# Patient Record
Sex: FEMALE | Race: WHITE | NOT HISPANIC OR LATINO | URBAN - METROPOLITAN AREA
[De-identification: names, ages, dates, MRNs, and addresses within clinical notes are randomized per-mention and may not be internally consistent; named-entity substitution may affect disease eponyms.]

---

## 2018-01-01 ENCOUNTER — OFFICE VISIT - HEALTHEAST (OUTPATIENT)
Dept: GERIATRICS | Facility: CLINIC | Age: 83
End: 2018-01-01

## 2018-01-01 ENCOUNTER — RECORDS - HEALTHEAST (OUTPATIENT)
Dept: LAB | Facility: CLINIC | Age: 83
End: 2018-01-01

## 2018-01-01 ENCOUNTER — AMBULATORY - HEALTHEAST (OUTPATIENT)
Dept: GERIATRICS | Facility: CLINIC | Age: 83
End: 2018-01-01

## 2018-01-01 ENCOUNTER — HOSPITAL ENCOUNTER (OUTPATIENT)
Dept: RADIOLOGY | Facility: HOSPITAL | Age: 83
Discharge: HOME | End: 2018-06-11

## 2018-01-01 ENCOUNTER — RECORDS - HEALTHEAST (OUTPATIENT)
Dept: ADMINISTRATIVE | Facility: OTHER | Age: 83
End: 2018-01-01

## 2018-01-01 ENCOUNTER — AMBULATORY - HEALTHEAST (OUTPATIENT)
Dept: ADMINISTRATIVE | Facility: CLINIC | Age: 83
End: 2018-01-01

## 2018-01-01 DIAGNOSIS — D64.9 ANEMIA: ICD-10-CM

## 2018-01-01 DIAGNOSIS — E46 MALNUTRITION (H): ICD-10-CM

## 2018-01-01 DIAGNOSIS — J44.9 COPD (CHRONIC OBSTRUCTIVE PULMONARY DISEASE) (H): ICD-10-CM

## 2018-01-01 DIAGNOSIS — J18.9 CAP (COMMUNITY ACQUIRED PNEUMONIA): ICD-10-CM

## 2018-01-01 DIAGNOSIS — R53.1 WEAKNESS: ICD-10-CM

## 2018-01-01 DIAGNOSIS — I10 HYPERTENSION: ICD-10-CM

## 2018-01-01 DIAGNOSIS — T17.908A ASPIRATION INTO AIRWAY: ICD-10-CM

## 2018-01-01 DIAGNOSIS — R13.10 DYSPHAGIA, IDIOPATHIC: ICD-10-CM

## 2018-01-01 LAB
ANION GAP SERPL CALCULATED.3IONS-SCNC: 6 MMOL/L (ref 5–18)
BUN SERPL-MCNC: 12 MG/DL (ref 8–28)
CALCIUM SERPL-MCNC: 9 MG/DL (ref 8.5–10.5)
CHLORIDE BLD-SCNC: 100 MMOL/L (ref 98–107)
CO2 SERPL-SCNC: 39 MMOL/L (ref 22–31)
CREAT SERPL-MCNC: 0.5 MG/DL (ref 0.6–1.1)
ERYTHROCYTE [DISTWIDTH] IN BLOOD BY AUTOMATED COUNT: 16.2 % (ref 11–14.5)
GFR SERPL CREATININE-BSD FRML MDRD: >60 ML/MIN/1.73M2
GLUCOSE BLD-MCNC: 89 MG/DL (ref 70–125)
HCT VFR BLD AUTO: 29.7 % (ref 35–47)
HGB BLD-MCNC: 8.3 G/DL (ref 12–16)
MAGNESIUM SERPL-MCNC: 1.6 MG/DL (ref 1.8–2.6)
MCH RBC QN AUTO: 28.9 PG (ref 27–34)
MCHC RBC AUTO-ENTMCNC: 27.9 G/DL (ref 32–36)
MCV RBC AUTO: 104 FL (ref 80–100)
PLATELET # BLD AUTO: 191 THOU/UL (ref 140–440)
PMV BLD AUTO: 10.7 FL (ref 8.5–12.5)
POTASSIUM BLD-SCNC: 4.6 MMOL/L (ref 3.5–5)
RBC # BLD AUTO: 2.87 MILL/UL (ref 3.8–5.4)
SODIUM SERPL-SCNC: 145 MMOL/L (ref 136–145)
WBC: 5.4 THOU/UL (ref 4–11)

## 2018-01-01 RX ORDER — FERROUS SULFATE 325(65) MG
1 TABLET ORAL
Status: SHIPPED | COMMUNITY
Start: 2018-01-01

## 2018-01-01 RX ORDER — CETIRIZINE HYDROCHLORIDE 5 MG/1
5 TABLET ORAL DAILY
Status: SHIPPED | COMMUNITY
Start: 2018-01-01

## 2018-01-01 RX ORDER — MIRTAZAPINE 7.5 MG/1
7.5 TABLET, FILM COATED ORAL AT BEDTIME
Status: SHIPPED | COMMUNITY
Start: 2018-01-01

## 2018-01-01 RX ORDER — LEVALBUTEROL INHALATION SOLUTION 0.63 MG/3ML
1 SOLUTION RESPIRATORY (INHALATION) 4 TIMES DAILY
Status: SHIPPED | COMMUNITY
Start: 2018-01-01

## 2018-01-01 RX ORDER — LISINOPRIL 5 MG/1
5 TABLET ORAL DAILY
Status: SHIPPED | COMMUNITY
Start: 2018-01-01

## 2018-01-01 RX ORDER — FLUTICASONE PROPIONATE AND SALMETEROL XINAFOATE 115; 21 UG/1; UG/1
2 AEROSOL, METERED RESPIRATORY (INHALATION) 2 TIMES DAILY
Status: SHIPPED | COMMUNITY
Start: 2018-01-01

## 2018-01-01 RX ORDER — BENZONATATE 100 MG/1
100 CAPSULE ORAL 3 TIMES DAILY PRN
Status: SHIPPED | COMMUNITY
Start: 2018-01-01

## 2018-01-01 RX ORDER — ACETAMINOPHEN 500 MG
500-1000 TABLET ORAL EVERY 4 HOURS PRN
Status: SHIPPED | COMMUNITY
Start: 2018-01-01

## 2018-01-01 RX ORDER — GABAPENTIN 100 MG/1
100 CAPSULE ORAL 2 TIMES DAILY
Status: SHIPPED | COMMUNITY
Start: 2018-01-01

## 2018-01-01 RX ORDER — IPRATROPIUM BROMIDE AND ALBUTEROL SULFATE 2.5; .5 MG/3ML; MG/3ML
3 SOLUTION RESPIRATORY (INHALATION) EVERY 4 HOURS PRN
Status: SHIPPED | COMMUNITY
Start: 2018-01-01

## 2019-01-01 ENCOUNTER — OFFICE VISIT - HEALTHEAST (OUTPATIENT)
Dept: HOSPICE | Facility: HOSPICE | Age: 84
End: 2019-01-01

## 2021-06-16 PROBLEM — T17.908A ASPIRATION INTO AIRWAY: Status: ACTIVE | Noted: 2018-01-01

## 2021-06-16 PROBLEM — J18.9 CAP (COMMUNITY ACQUIRED PNEUMONIA): Status: ACTIVE | Noted: 2018-01-01

## 2021-06-16 PROBLEM — Z51.5 HOSPICE CARE: Status: ACTIVE | Noted: 2019-01-01

## 2021-06-16 PROBLEM — J44.9 COPD (CHRONIC OBSTRUCTIVE PULMONARY DISEASE) (H): Status: ACTIVE | Noted: 2018-01-01

## 2021-06-16 PROBLEM — I10 HYPERTENSION: Status: ACTIVE | Noted: 2018-01-01

## 2021-06-16 PROBLEM — R53.1 WEAKNESS: Status: ACTIVE | Noted: 2018-01-01

## 2021-06-18 NOTE — PROGRESS NOTES
[facility]    Facility:   Formerly named [facility] [[id number]]   Code Status: DNR/DNI  PCP: Sadia Jenkins MD   Phone: [phone]   Fax: [phone]      CHIEF COMPLAINT/REASON FOR VISIT:  Chief Complaint   Patient presents with     Discharge Summary     [facility]CU  5/25/18-6/27/18.       HISTORY COURSE:    HPI (TCU H & P 6/1/18):   Kerry is a 84 y.o. female with history of COPD, dependent on oxygen at home recently hospitalized for pneumonia and weakness at [facility]. Her discharge summary is partially excerpted below:    HPI: 84-year-old female comes from a senior living facility with several days of weakness. This has been going on about a week. Kids brought some Mother's Day food over a couple of days ago and she was feeling weak at that time. She did eat. However, she has not had much appetite all week. She has a chronic right-handed tremor and feels like her tremor is worse recently. Has a nonproductive cough with occasional thick yellow sputum over the last several days. Has chronic oxygen-dependent COPD and feels a little bit more dyspneic with exertion. Mostly, she feels weak. This morning, she could barely get out of her chair, so she comes to the emergency department. No falls. She does use a cane or a walker all the time. No medication changes recently. Has been compliant with her medications. Has been wearing her oxygen religiously. No fever. No nausea or vomiting. No diarrhea or constipation. No melena.    Hospital Course:   Acute on chronic hypoxic and hypercarbic respiratory failure  -felt due to LLL community acquired pneumonia, started on broad spectrum abx but she continued to worsen felt due to COPD exacerbation  -developed worsened hypercarbic and hypoxic respiratory failure requiring intubation  -after several days was extubated, back to baseline oxygenation of 3liters via nc  -finished course of abx, and prednisone quickly tapered  -given severity  of disease she remains tenuous and changed to DNAR status. Recommended comfort cares in future if breathing deteriorates  -given ongoing weakness plans made for discharge to TCU for ongoing therapies      Weakness.   - Suspect this is multifactorial due to a combination of pneumonia and anemia.     -Physical Therapy and Occupational Therapy saw and TCU recommended and arranged  -her swallow also quite weak, video swallow showed aspiration on thin liquids, so put on pudding thick liquids with plans for ongoing speech therapy and f/u video swallow in a couple of weeks  -given wt loss and anemia, there is concern of underlying malignancy, with initial w/u including CT chest/abd/pelvis negative(with recommendation for 3 month chest CT f/u)     Severe protein/calorie malnutrition  -30# wt loss over past 2 yrs  -discussed need to continue to push calories and nutritious foods     Anemia.    -No evidence of melena or other blood loss.    -hemolysis testing negative  -very low iron levels, given iv venofer load and started po iron upon discharge  - SPEP still pending at time of discharge     Pulm artery hypertension   -echo done prior to admission with preserved EF, +diastolic dysfunction, moderate pulm HTN and moderate tricuspid regurgitation  -given pulm HTN treated with diuresis in ICU but not felt indicated as outpt     Pressure ulcers  -she does have unstageable pressure ulcers buttocks which were present on admission     Overall stabilized and discharged to TCU on 5/25/18 for PT, OT, nursing cares, medical management and monitoring.     TCU:  TCU course slow due to general weakness and debilitation. Initial cough and more shortness of breath, improved as she recovered from pneumonia. Baseline aspiration concern with pudding thick recommendation. She had repeat VFSS to confirm. She continued on oxygen as per home. No fever. Medical status and conditions were monitored as warranted while she participated in therapy and  received nursing care and assistance. Eventually felt almost at baseline respiratory status. Concern she would not be able to return home independent but  worked with her and her family. She is planning to discharge home tomorrow 6/27/18 with at least 4 hours hired aide services and additional home health care services post TCU protocol.      PHYSICAL EXAM:   General: Patient is alert pleasant female, no distress.   Vitals: Reviewed. On oxygen.  HEENT: Head is NCAT. Eyes show no injection or icterus. Nares negative. Oropharynx well hydrated.  Neck: Supple. No tenderness or adenopathy. No JVD.  Lungs: Decreased breath sounds. No wheezes.  Cardiovascular: Regular rate and rhythm, normal S1, S2.  Back: No spinal or CVA tenderness.  Abdomen: Soft, no tenderness on exam. Bowel sounds present. No guarding rebound or rigidity.  Extremities: One plus LE edema.      MEDICATION LIST:  Current Outpatient Prescriptions   Medication Sig     acetaminophen (TYLENOL) 500 MG tablet Take 500-1,000 mg by mouth every 4 (four) hours as needed for pain.     aspirin 81 MG EC tablet Take 81 mg by mouth daily.     benzonatate (TESSALON) 100 MG capsule Take 100 mg by mouth 3 (three) times a day as needed for cough.     cetirizine (ZYRTEC) 5 MG tablet Take 5 mg by mouth daily.     ferrous sulfate 325 (65 FE) MG tablet Take 1 tablet by mouth daily with breakfast.     fluticasone-salmeterol (ADVAIR HFA) 115-21 mcg/actuation inhaler Inhale 2 puffs 2 (two) times a day.     folic acid/multivit-min/lutein (CENTRUM SILVER ORAL) Take 1 tablet by mouth daily.     gabapentin (NEURONTIN) 100 MG capsule Take 100 mg by mouth 2 (two) times a day.     ipratropium-albuterol (DUO-NEB) 0.5-2.5 mg/3 mL nebulizer Inhale 3 mL every 4 (four) hours as needed.     levalbuterol (XOPENEX) 0.63 mg/3 mL nebulizer solution Take 1 ampule by nebulization 4 (four) times a day.     lisinopril (PRINIVIL,ZESTRIL) 5 MG tablet Take 5 mg by mouth daily.      mirtazapine (REMERON) 7.5 MG tablet Take 7.5 mg by mouth at bedtime.     triamcinolone (NASACORT) 55 mcg nasal inhaler 2 sprays into each nostril daily.     umeclidinium (INCRUSE ELLIPTA) 62.5 mcg/actuation DsDv inhaler Inhale 1 puff daily.       DISCHARGE DIAGNOSIS:  1. COPD. Oxygen dependent. Acute on chronic respiratory failure due to pneumonia.   2. CAP. Treated.  3. Anemia. She is on iron.  4. HTN.   5. Malnutrition.   6. Weakness. Overall debilitation.   7. Aspiration risk. She is on pudding thick.       Total time greater than 30 minutes discharge coordination.      MEDICAL EQUIPMENT NEEDS:  None.      DISCHARGE PLAN/FACE TO FACE:  I certify that services are/were furnished while this patient was under the care of a physician and that a physician or an allowed non-physician practitioner (NPP), had a face-to-face encounter that meets the physician face-to-face encounter requirements. The encounter was in whole, or in part, related to the primary reason for home health. The patient is confined to his/her home and needs intermittent skilled nursing, physical therapy, speech-language pathology, or the continued need for occupational therapy. A plan of care has been established by a physician and is periodically reviewed by a physician.    Date of Face-to-Face Encounter: 6/26/18.    I certify that, based on my findings, the following services are medically necessary home health services: PT, OT, ST, HHA, RN.    My clinical findings support the need for the above skilled services because: Chronic respiratory failure, recent hospitalization for pneumonia, ongoing concern for aspiration with need for altered diet and liquids. She will benefit from additional therapy as she returns home. Needs aide to assist with cares, ongoing speech therapy and nurse for clinical assessments.     This patient is homebound because: General debilitation, chronic oxygen use. Too strenuous to leave the home.     The patient is, or has  been, under my care and I have initiated the establishment of the plan of care. This patient will be followed by a physician who will periodically review the plan of care.    Schedule follow up visit with primary care provider within 7 days to reestablish care.    Electronically signed by: Mayuri Hubbard MD

## 2021-06-18 NOTE — PROGRESS NOTES
[facility]    Facility:   [facility] [[id number]]   Code Status: DNR/DNI       Chief Complaint   Patient presents with     Follow Up     TCU 6/21/18.       HPI (TCU H & P 6/1/18):   Kerry is a 84 y.o. female with history of COPD, dependent on oxygen at home recently hospitalized for pneumonia and weakness at [facility]. Her discharge summary is partially excerpted below:    HPI: 84-year-old female comes from a senior living facility with several days of weakness. This has been going on about a week. Kids brought some Mother's Day food over a couple of days ago and she was feeling weak at that time. She did eat. However, she has not had much appetite all week. She has a chronic right-handed tremor and feels like her tremor is worse recently. Has a nonproductive cough with occasional thick yellow sputum over the last several days. Has chronic oxygen-dependent COPD and feels a little bit more dyspneic with exertion. Mostly, she feels weak. This morning, she could barely get out of her chair, so she comes to the emergency department. No falls. She does use a cane or a walker all the time. No medication changes recently. Has been compliant with her medications. Has been wearing her oxygen religiously. No fever. No nausea or vomiting. No diarrhea or constipation. No melena.    Hospital Course:   Acute on chronic hypoxic and hypercarbic respiratory failure  -felt due to LLL community acquired pneumonia, started on broad spectrum abx but she continued to worsen felt due to COPD exacerbation  -developed worsened hypercarbic and hypoxic respiratory failure requiring intubation  -after several days was extubated, back to baseline oxygenation of 3liters via nc  -finished course of abx, and prednisone quickly tapered  -given severity of disease she remains tenuous and changed to DNAR status. Recommended comfort cares in future if breathing deteriorates  -given ongoing  weakness plans made for discharge to TCU for ongoing therapies      Weakness.   - Suspect this is multifactorial due to a combination of pneumonia and anemia.     -Physical Therapy and Occupational Therapy saw and TCU recommended and arranged  -her swallow also quite weak, video swallow showed aspiration on thin liquids, so put on pudding thick liquids with plans for ongoing speech therapy and f/u video swallow in a couple of weeks  -given wt loss and anemia, there is concern of underlying malignancy, with initial w/u including CT chest/abd/pelvis negative(with recommendation for 3 month chest CT f/u)     Severe protein/calorie malnutrition  -30# wt loss over past 2 yrs  -discussed need to continue to push calories and nutritious foods     Anemia.    -No evidence of melena or other blood loss.    -hemolysis testing negative  -very low iron levels, given iv venofer load and started po iron upon discharge  - SPEP still pending at time of discharge     Pulm artery hypertension   -echo done prior to admission with preserved EF, +diastolic dysfunction, moderate pulm HTN and moderate tricuspid regurgitation  -given pulm HTN treated with diuresis in ICU but not felt indicated as outpt     Pressure ulcers  -she does have unstageable pressure ulcers buttocks which were present on admission     Overall stabilized and discharged to TCU on 5/25/18 for PT, OT, nursing cares, medical management and monitoring.       Past Medical History:  Past Medical History:   Diagnosis Date     Anemia      Calculus of kidney      CAP (community acquired pneumonia)      Chronic rhinitis      Closed fracture of multiple ribs      COPD (chronic obstructive pulmonary disease) (H)      Depression      Herpes zoster      HTN (hypertension)      IBS (irritable bowel syndrome)      Kyphosis      Osteoarthritis      Osteoporosis      Pathologic fracture of vertebra      Post herpetic neuralgia      Pulmonary nodule      Septicemia (H)         Medications:  Current Outpatient Prescriptions   Medication Sig     acetaminophen (TYLENOL) 500 MG tablet Take 500-1,000 mg by mouth every 4 (four) hours as needed for pain.     aspirin 81 MG EC tablet Take 81 mg by mouth daily.     benzonatate (TESSALON) 100 MG capsule Take 100 mg by mouth 3 (three) times a day as needed for cough.     cetirizine (ZYRTEC) 5 MG tablet Take 5 mg by mouth daily.     ferrous sulfate 325 (65 FE) MG tablet Take 1 tablet by mouth daily with breakfast.     fluticasone-salmeterol (ADVAIR HFA) 115-21 mcg/actuation inhaler Inhale 2 puffs 2 (two) times a day.     folic acid/multivit-min/lutein (CENTRUM SILVER ORAL) Take 1 tablet by mouth daily.     gabapentin (NEURONTIN) 100 MG capsule Take 100 mg by mouth 2 (two) times a day.     ipratropium-albuterol (DUO-NEB) 0.5-2.5 mg/3 mL nebulizer Inhale 3 mL every 4 (four) hours as needed.     levalbuterol (XOPENEX) 0.63 mg/3 mL nebulizer solution Take 1 ampule by nebulization 4 (four) times a day.     lisinopril (PRINIVIL,ZESTRIL) 5 MG tablet Take 5 mg by mouth daily.     mirtazapine (REMERON) 7.5 MG tablet Take 7.5 mg by mouth at bedtime.     OLANZapine (ZYPREXA) 2.5 MG tablet Take 2.5 mg by mouth at bedtime.     triamcinolone (NASACORT) 55 mcg nasal inhaler 2 sprays into each nostril daily.     umeclidinium (INCRUSE ELLIPTA) 62.5 mcg/actuation DsDv inhaler Inhale 1 puff daily.       Today:  No new concerns. Continues to be weak but overall improved from admission. She is going to discharge back home next week with added help at home. She has shortness of breath but not more than baseline per her report. No chest pain. Denies dizziness. Has LE edema, no change. Appetite fair, she is on pudding thick. Has speech therapy ongoing.      Physical Exam:   General: Patient is alert pleasant female, no distress.   Vitals: Reviewed. On oxygen.  HEENT: Head is NCAT. Eyes show no injection or icterus. Nares negative. Oropharynx well hydrated.  Neck:  Supple. No tenderness or adenopathy. No JVD.  Lungs: Decreased breath sounds. No wheezes.  Cardiovascular: Regular rate and rhythm, normal S1, S2.  Back: No spinal or CVA tenderness.  Abdomen: Soft, no tenderness on exam. Bowel sounds present. No guarding rebound or rigidity.  Extremities: One plus LE edema.      Labs:    Results for orders placed or performed in visit on 06/15/18   Basic Metabolic Panel   Result Value Ref Range    Sodium 145 136 - 145 mmol/L    Potassium 4.6 3.5 - 5.0 mmol/L    Chloride 100 98 - 107 mmol/L    CO2 39 (H) 22 - 31 mmol/L    Anion Gap, Calculation 6 5 - 18 mmol/L    Glucose 89 70 - 125 mg/dL    Calcium 9.0 8.5 - 10.5 mg/dL    BUN 12 8 - 28 mg/dL    Creatinine 0.50 (L) 0.60 - 1.10 mg/dL    GFR MDRD Af Amer >60 >60 mL/min/1.73m2    GFR MDRD Non Af Amer >60 >60 mL/min/1.73m2       Lab Results   Component Value Date    WBC 5.4 06/15/2018    HGB 8.3 (L) 06/15/2018    HCT 29.7 (L) 06/15/2018     (H) 06/15/2018     06/15/2018       Assessment/Plan:  1. Oxygen dependent COPD with chronic resp failure.   2. HTN. Overall satisfactory control. Continue Lisinopril.  3. Anemia. Last hgb 8.3. Continue iron.  4. Aspiration. Indefinite pudding thick recommendation.      Electronically signed by: Mayuri Hubbard MD

## 2021-06-18 NOTE — PROGRESS NOTES
[facility]      Facility:    [facility] [[id number]]  Code Status: DNR/DNI       Chief Complaint/Reason for Visit:  Chief Complaint   Patient presents with     H & P     New admit to TCU for CAP, COPD. (H & P 6/1/18).       HPI:   Kerry is a 84 y.o. female with history of COPD, dependent on oxygen at home recently hospitalized for pneumonia and weakness at [facility]. Her discharge summary is partially excerpted below:    HPI: 84-year-old female comes from a senior living facility with several days of weakness. This has been going on about a week. Kids brought some Mother's Day food over a couple of days ago and she was feeling weak at that time. She did eat. However, she has not had much appetite all week. She has a chronic right-handed tremor and feels like her tremor is worse recently. Has a nonproductive cough with occasional thick yellow sputum over the last several days. Has chronic oxygen-dependent COPD and feels a little bit more dyspneic with exertion. Mostly, she feels weak. This morning, she could barely get out of her chair, so she comes to the emergency department. No falls. She does use a cane or a walker all the time. No medication changes recently. Has been compliant with her medications. Has been wearing her oxygen religiously. No fever. No nausea or vomiting. No diarrhea or constipation. No melena.    Hospital Course: admitted through ER to medical unit and the following issues were addressed:    Acute on chronic hypoxic and hypercarbic respiratory failure  -felt due to LLL community acquired pneumonia, started on broad spectrum abx but she continued to worsen felt due to COPD exacerbation  -developed worsened hypercarbic and hypoxic respiratory failure requiring intubation  -after several days was extubated, back to baseline oxygenation of 3liters via nc  -finished course of abx, and prednisone quickly tapered  -given severity of disease she  remains tenuous and changed to DNAR status. Recommended comfort cares in future if breathing deteriorates  -given ongoing weakness plans made for discharge to TCU for ongoing therapies      Weakness.   - Suspect this is multifactorial due to a combination of pneumonia and anemia.     -Physical Therapy and Occupational Therapy saw and TCU recommended and arranged  -her swallow also quite weak, video swallow showed aspiration on thin liquids, so put on pudding thick liquids with plans for ongoing speech therapy and f/u video swallow in a couple of weeks  -given wt loss and anemia, there is concern of underlying malignancy, with initial w/u including CT chest/abd/pelvis negative(with recommendation for 3 month chest CT f/u)     Severe protein/calorie malnutrition  -30# wt loss over past 2 yrs  -discussed need to continue to push calories and nutritious foods     Anemia.    -No evidence of melena or other blood loss.    -hemolysis testing negative  -very low iron levels, given iv venofer load and started po iron upon discharge  - SPEP still pending at time of discharge     pulm artery hypertension   -echo done prior to admission with preserved EF, +diastolic dysfunction, moderate pulm HTN and moderate tricuspid regurgitation  -given pulm HTN treated with diuresis in ICU but not felt indicated as outpt     Pressure ulcers  -she does have unstageable pressure ulcers buttocks which were present on admission     Overall stabilized and discharged to TCU on 5/25/18 for PT, OT, nursing cares, medical management and monitoring.     Today:  She feels weak but a little better than the hospital. Some cough, little phlegm. No fever. On oxygen as at home. Shortness of breath with exertion. No chest pain. Appetite is fair. No diarrhea or constipation. No abdominal pain. No new vision or hearing problems.       Past Medical History:  Past Medical History:   Diagnosis Date     Anemia      Calculus of kidney      CAP (community acquired  pneumonia)      Chronic rhinitis      Closed fracture of multiple ribs      COPD (chronic obstructive pulmonary disease)      Depression      Herpes zoster      HTN (hypertension)      IBS (irritable bowel syndrome)      Kyphosis      Osteoarthritis      Osteoporosis      Pathologic fracture of vertebra      Post herpetic neuralgia      Pulmonary nodule      Septicemia            Surgical History:  Past Surgical History:   Procedure Laterality Date     APPENDECTOMY       CARPAL TUNNEL RELEASE       CATARACT EXTRACTION       TOTAL HIP ARTHROPLASTY Left        Family History:   Family History   Problem Relation Age of Onset     Macular degeneration Mother      Macular degeneration Sister        Social History:    Social History     Social History     Marital status:      Spouse name: N/A     Number of children: N/A     Years of education: N/A     Social History Main Topics     Smoking status: Former Smoker     Packs/day: 0.50     Years: 30.00     Types: Cigarettes     Quit date: 11/2/1992     Smokeless tobacco: Never Used     Alcohol use No     Drug use: Not on file     Sexual activity: Not on file     Other Topics Concern     Not on file     Social History Narrative     No narrative on file        Medications:  Current Outpatient Prescriptions   Medication Sig     acetaminophen (TYLENOL) 500 MG tablet Take 500-1,000 mg by mouth every 4 (four) hours as needed for pain.     aspirin 81 MG EC tablet Take 81 mg by mouth daily.     benzonatate (TESSALON) 100 MG capsule Take 100 mg by mouth 3 (three) times a day as needed for cough.     cetirizine (ZYRTEC) 5 MG tablet Take 5 mg by mouth daily.     ferrous sulfate 325 (65 FE) MG tablet Take 1 tablet by mouth daily with breakfast.     fluticasone-salmeterol (ADVAIR HFA) 115-21 mcg/actuation inhaler Inhale 2 puffs 2 (two) times a day.     folic acid/multivit-min/lutein (CENTRUM SILVER ORAL) Take 1 tablet by mouth daily.     gabapentin (NEURONTIN) 100 MG capsule Take 100  mg by mouth 2 (two) times a day.     ipratropium-albuterol (DUO-NEB) 0.5-2.5 mg/3 mL nebulizer Inhale 3 mL every 4 (four) hours as needed.     levalbuterol (XOPENEX) 0.63 mg/3 mL nebulizer solution Take 1 ampule by nebulization 4 (four) times a day.     lisinopril (PRINIVIL,ZESTRIL) 5 MG tablet Take 5 mg by mouth daily.     mirtazapine (REMERON) 7.5 MG tablet Take 7.5 mg by mouth at bedtime.     OLANZapine (ZYPREXA) 2.5 MG tablet Take 2.5 mg by mouth at bedtime.     triamcinolone (NASACORT) 55 mcg nasal inhaler 2 sprays into each nostril daily.     umeclidinium (INCRUSE ELLIPTA) 62.5 mcg/actuation DsDv inhaler Inhale 1 puff daily.       Allergies:  Allergies   Allergen Reactions     Celecoxib      Codeine      Influenza Virus Vaccines      Penicillins      Sulfa (Sulfonamide Antibiotics)      Tetanus Toxoid, Adsorbed        Review of Systems:  Pertinent items are noted in HPI.      Physical Exam:   General: Patient is alert pleasant female, no distress.   Vitals: Reviewed. O2 per NC.  HEENT: Head is NCAT. Eyes show no injection or icterus. Nares negative. Oropharynx well hydrated.  Neck: Supple. No tenderness or adenopathy. No JVD.  Lungs: Decreased breath sounds. Rhonchi with cough. No wheezes.  Cardiovascular: Regular rate and rhythm, normal S1, S2.  Back: No spinal or CVA tenderness.  Abdomen: Soft, no tenderness on exam. Bowel sounds present. No guarding rebound or rigidity.  : Deferred.  Extremities: Mild edema is noted.  Musculoskeletal: Thin.  Skin: Dry skin.  Psych: Mood appears pretty good.      Labs:  Outside hospital.      Assessment/Plan:  1. COPD. Home oxygen dependent. Acute on chronic respiratory failure due to pneumonia.   2. CAP. Done with antibiotics. Observe. No fever, some cough, overlap with COPD.  3. Anemia. She is on iron.  4. HTN. Cont lisinopril. Monitor BPs.  5. Malnutrition. Dietary following.  6. Weakness. Overall debilitation. Here for therapy.  7. Aspiration risk. She is on pudding  thick. Working with ST.  8. Code status is DNR/DNI.      Total time greater than 55 minutes, greater than 50% counseling and coordination of care, time spent in interview and examination of patient, review of records, discussion with nursing staff.      Electronically signed by: Mayuri Hubbard MD

## 2021-06-18 NOTE — PROGRESS NOTES
Speech Language/Pathology  Videofluoroscopic Swallow Study       Problem:  There is no problem list on file for this patient.      Onset Date: 6/6/2018 (order date)  Reason for Evaluation: Recent hx of dysphagia  Pertinent History: COPD with O2 dependence; acute on chronic respiratory failure due to pneumonia; anemia; malnutrition; weight loss (30# over the past 2 years); weakness and overall debilitation  Current Diet: NDD3 textures and pudding-thick (AKA: spoon-thick) liquids  Baseline Diet: Regular textures and thin liquids (prior to recent hospitalization)    Patient is an 85 y/o female referred for recent history of dysphagia. She is currently residing in the TCU at [facility]. Patient was recently hospitalized for pneumonia and overall weakness at [facility]. A previous Video Swallow Study on 5/23/18 showed aspiration of thin and nectar-thick liquid, as well as deep laryngeal penetration and eventual trace aspiration with honey-thick liquid. Patient is currently on pudding-thick liquids at the TCU and is receiving Speech Therapy for dysphagia. The purpose of this study to assess for any improvement in patient's swallow function, determine her aspiration risk, and establish additional safe swallowing strategies as appropriate.    Patient presents as alert and cooperative during this evaluation. She was accompanied by her daughter, Dunia, who observed study and was present afterwards to receive education regarding results and recommendations.    An  was not applicable    Patient was given honey-thick liquid and puree consistency only. Thin and nectar-thick liquid were not given due to high aspiration risk.    Oral Phase:    Dentition/Oral hygiene: Adequate    Bolus prep and oral control were mildly impaired. Piecemeal deglutition was noted with puree. Patient often completed 3-4 swallows per single bolus.    Anterior-Posterior transit was not impaired.    Premature spillage did not  occur with either consistency.    Tongue base retraction was mildly impaired.    Mild oral stasis was noted along the tongue base with both honey-thick liquid and puree.    Pharyngeal Phase:    Edgar aspiration occurred with honey-thick liquid. This occurred after the swallow from stasis in the pyriform sinuses. Patient had no cough response (i.e., aspiration was silent).    Use of the chin tuck posture resulted in deep laryngeal penetration to the vocal folds with honey-thick liquid. Again, this was related to stasis in the pyriform sinuses. Deep laryngeal penetration was also noted with initial bolus of puree when patient was using the chin tuck posture. This was thought to be residual penetration from prior presentations of honey-thick liquid.    Swallow response was timely with both consistencies.     Epiglottic movement was generally complete to near complete with both consistencies. There were one or two occasions in which epiglottic movement was in a posterior to inferior pattern.    Diffuse pharyngeal stasis occurred with both honey-thick liquid and puree consistency. With honey-thick, there was moderate stasis in the valleculae and pyriform sinuses, and mild stasis along the posterior pharyngeal wall. The majority of this cleared with spontaneous dry swallows. There was also moderate stasis in the valleculae and pyriform sinuses following boluses of puree. Initially, patient was able to clear most of this with spontaneous dry swallows. However, towards end of study, she was noted to still have moderate pharyngeal stasis despite multiple dry swallows. This appeared indicative of fatigue of the swallowing mechanism.    Pharyngeal constriction was moderately impaired.    Hyolaryngeal elevation was not impaired. Hyolaryngeal excursion was not impaired.    Cricopharyngeal function was mildly to moderately impaired. Patient is noted to have a mildly prominent cricopharyngeus. A small amount of stasis was noted  just below this following boluses of both honey-thick liquid and puree consistency. Incomplete relaxation of the cricopharyngeus may be contributing to decreased clearance of the pyriform sinuses after swallows, thereby increasing aspiration risk. Cervical esophageal function was difficult to assess due to suboptimal positioning (i.e., patient's shoulders were obstructing view).     Assessment:    Patient demonstrated mild oral dysphagia and moderate-severe pharyngeal dysphagia.    Patient is at high aspiration risk with thin, nectar-thick, and honey-thick liquid. She is at mild-moderate aspiration risk with puree consistency.    Rehab potential is guarded based on prior level of function, evaluation results and medical status.    Recommendations:    Plan: Continue current diet of NDD3 textures and pudding-thick liquids at this time.    Strategies: Patient to follow standard safe swallowing precautions, including sitting fully upright for all intake, eating slowly, and taking one small bite or sip at a time. Patient to also complete a chin tuck prior to swallowing each bite/sip.    Continue Speech Therapy at TCU. Frequency and duration to be determined by primary SLP.    Referrals: Repeat Video Swallow Study in 3-4 weeks or per primary SLP's discretion. Consider GI consult due to suspected cricopharyngeal dysfunction which may be contributing to patient's pharyngeal dysphagia.    30 dysphagia minutes    Aruna Stephenson MA, CCC-SLP

## 2021-06-18 NOTE — PROGRESS NOTES
[facility]    Facility:   [facility] [[id number]]   Code Status: DNR/DNI       Chief Complaint   Patient presents with     Follow Up     TCU 6/8/18.       HPI (TCU H & P 6/1/18):   Kerry is a 84 y.o. female with history of COPD, dependent on oxygen at home recently hospitalized for pneumonia and weakness at [facility]. Her discharge summary is partially excerpted below:    HPI: 84-year-old female comes from a senior living facility with several days of weakness. This has been going on about a week. Kids brought some Mother's Day food over a couple of days ago and she was feeling weak at that time. She did eat. However, she has not had much appetite all week. She has a chronic right-handed tremor and feels like her tremor is worse recently. Has a nonproductive cough with occasional thick yellow sputum over the last several days. Has chronic oxygen-dependent COPD and feels a little bit more dyspneic with exertion. Mostly, she feels weak. This morning, she could barely get out of her chair, so she comes to the emergency department. No falls. She does use a cane or a walker all the time. No medication changes recently. Has been compliant with her medications. Has been wearing her oxygen religiously. No fever. No nausea or vomiting. No diarrhea or constipation. No melena.    Hospital Course: admitted through ER to medical unit and the following issues were addressed:    Acute on chronic hypoxic and hypercarbic respiratory failure  -felt due to LLL community acquired pneumonia, started on broad spectrum abx but she continued to worsen felt due to COPD exacerbation  -developed worsened hypercarbic and hypoxic respiratory failure requiring intubation  -after several days was extubated, back to baseline oxygenation of 3liters via nc  -finished course of abx, and prednisone quickly tapered  -given severity of disease she remains tenuous and changed to DNAR status.  Recommended comfort cares in future if breathing deteriorates  -given ongoing weakness plans made for discharge to TCU for ongoing therapies      Weakness.   - Suspect this is multifactorial due to a combination of pneumonia and anemia.     -Physical Therapy and Occupational Therapy saw and TCU recommended and arranged  -her swallow also quite weak, video swallow showed aspiration on thin liquids, so put on pudding thick liquids with plans for ongoing speech therapy and f/u video swallow in a couple of weeks  -given wt loss and anemia, there is concern of underlying malignancy, with initial w/u including CT chest/abd/pelvis negative(with recommendation for 3 month chest CT f/u)     Severe protein/calorie malnutrition  -30# wt loss over past 2 yrs  -discussed need to continue to push calories and nutritious foods     Anemia.    -No evidence of melena or other blood loss.    -hemolysis testing negative  -very low iron levels, given iv venofer load and started po iron upon discharge  - SPEP still pending at time of discharge     pulm artery hypertension   -echo done prior to admission with preserved EF, +diastolic dysfunction, moderate pulm HTN and moderate tricuspid regurgitation  -given pulm HTN treated with diuresis in ICU but not felt indicated as outpt     Pressure ulcers  -she does have unstageable pressure ulcers buttocks which were present on admission     Overall stabilized and discharged to TCU on 5/25/18 for PT, OT, nursing cares, medical management and monitoring.       Past Medical History:  Past Medical History:   Diagnosis Date     Anemia      Calculus of kidney      CAP (community acquired pneumonia)      Chronic rhinitis      Closed fracture of multiple ribs      COPD (chronic obstructive pulmonary disease) (H)      Depression      Herpes zoster      HTN (hypertension)      IBS (irritable bowel syndrome)      Kyphosis      Osteoarthritis      Osteoporosis      Pathologic fracture of vertebra      Post  herpetic neuralgia      Pulmonary nodule      Septicemia (H)        Medications:  Current Outpatient Prescriptions   Medication Sig     acetaminophen (TYLENOL) 500 MG tablet Take 500-1,000 mg by mouth every 4 (four) hours as needed for pain.     aspirin 81 MG EC tablet Take 81 mg by mouth daily.     benzonatate (TESSALON) 100 MG capsule Take 100 mg by mouth 3 (three) times a day as needed for cough.     cetirizine (ZYRTEC) 5 MG tablet Take 5 mg by mouth daily.     ferrous sulfate 325 (65 FE) MG tablet Take 1 tablet by mouth daily with breakfast.     fluticasone-salmeterol (ADVAIR HFA) 115-21 mcg/actuation inhaler Inhale 2 puffs 2 (two) times a day.     folic acid/multivit-min/lutein (CENTRUM SILVER ORAL) Take 1 tablet by mouth daily.     gabapentin (NEURONTIN) 100 MG capsule Take 100 mg by mouth 2 (two) times a day.     ipratropium-albuterol (DUO-NEB) 0.5-2.5 mg/3 mL nebulizer Inhale 3 mL every 4 (four) hours as needed.     levalbuterol (XOPENEX) 0.63 mg/3 mL nebulizer solution Take 1 ampule by nebulization 4 (four) times a day.     lisinopril (PRINIVIL,ZESTRIL) 5 MG tablet Take 5 mg by mouth daily.     mirtazapine (REMERON) 7.5 MG tablet Take 7.5 mg by mouth at bedtime.     OLANZapine (ZYPREXA) 2.5 MG tablet Take 2.5 mg by mouth at bedtime.     triamcinolone (NASACORT) 55 mcg nasal inhaler 2 sprays into each nostril daily.     umeclidinium (INCRUSE ELLIPTA) 62.5 mcg/actuation DsDv inhaler Inhale 1 puff daily.       Today:  Had care conference, might be looking for prison. Continues on pudding thick foods/fluids. Has swallow study next week. Feels overall weak.       Physical Exam:   General: Patient is alert pleasant female, no distress.   Vitals: Reviewed. On oxygen.  HEENT: Head is NCAT. Eyes show no injection or icterus. Nares negative. Oropharynx well hydrated.  Neck: Supple. No tenderness or adenopathy. No JVD.  Lungs: Decreased breath sounds. Rhonchi with cough. No wheezes.  Cardiovascular: Regular rate and  rhythm, normal S1, S2.  Back: No spinal or CVA tenderness.  Abdomen: Soft, no tenderness on exam. Bowel sounds present. No guarding rebound or rigidity.  Extremities: One plus LE edema is noted.  Skin: Dry skin.  Psych: Mood appears pretty good.      Labs:  Outside hospital.      Assessment/Plan:  1. COPD. Home oxygen dependent. Acute on chronic respiratory failure due to pneumonia.   2. CAP. Done with antibiotics. Observe. No fever, some cough, overlap with COPD.  3. HTN. On lisinopril. BPs satisfactory.   4. Weakness. Overall debilitation. Malnutrition. Cont in therapy as able  5. Aspiration risk. She is on pudding thick. Working with ST. Has Swallow study next week.           Electronically signed by: Mayuri Hubbard MD

## 2021-06-18 NOTE — PROGRESS NOTES
[facility]    Facility:   [facility] [[id number]]   Code Status: DNR/DNI       Chief Complaint   Patient presents with     Follow Up     TCU 6/19/18.       HPI (TCU H & P 6/1/18):   Kerry is a 84 y.o. female with history of COPD, dependent on oxygen at home recently hospitalized for pneumonia and weakness at [facility]. Her discharge summary is partially excerpted below:    HPI: 84-year-old female comes from a senior living facility with several days of weakness. This has been going on about a week. Kids brought some Mother's Day food over a couple of days ago and she was feeling weak at that time. She did eat. However, she has not had much appetite all week. She has a chronic right-handed tremor and feels like her tremor is worse recently. Has a nonproductive cough with occasional thick yellow sputum over the last several days. Has chronic oxygen-dependent COPD and feels a little bit more dyspneic with exertion. Mostly, she feels weak. This morning, she could barely get out of her chair, so she comes to the emergency department. No falls. She does use a cane or a walker all the time. No medication changes recently. Has been compliant with her medications. Has been wearing her oxygen religiously. No fever. No nausea or vomiting. No diarrhea or constipation. No melena.    Hospital Course:   Acute on chronic hypoxic and hypercarbic respiratory failure  -felt due to LLL community acquired pneumonia, started on broad spectrum abx but she continued to worsen felt due to COPD exacerbation  -developed worsened hypercarbic and hypoxic respiratory failure requiring intubation  -after several days was extubated, back to baseline oxygenation of 3liters via nc  -finished course of abx, and prednisone quickly tapered  -given severity of disease she remains tenuous and changed to DNAR status. Recommended comfort cares in future if breathing deteriorates  -given ongoing  weakness plans made for discharge to TCU for ongoing therapies      Weakness.   - Suspect this is multifactorial due to a combination of pneumonia and anemia.     -Physical Therapy and Occupational Therapy saw and TCU recommended and arranged  -her swallow also quite weak, video swallow showed aspiration on thin liquids, so put on pudding thick liquids with plans for ongoing speech therapy and f/u video swallow in a couple of weeks  -given wt loss and anemia, there is concern of underlying malignancy, with initial w/u including CT chest/abd/pelvis negative(with recommendation for 3 month chest CT f/u)     Severe protein/calorie malnutrition  -30# wt loss over past 2 yrs  -discussed need to continue to push calories and nutritious foods     Anemia.    -No evidence of melena or other blood loss.    -hemolysis testing negative  -very low iron levels, given iv venofer load and started po iron upon discharge  - SPEP still pending at time of discharge     Pulm artery hypertension   -echo done prior to admission with preserved EF, +diastolic dysfunction, moderate pulm HTN and moderate tricuspid regurgitation  -given pulm HTN treated with diuresis in ICU but not felt indicated as outpt     Pressure ulcers  -she does have unstageable pressure ulcers buttocks which were present on admission     Overall stabilized and discharged to TCU on 5/25/18 for PT, OT, nursing cares, medical management and monitoring.       Past Medical History:  Past Medical History:   Diagnosis Date     Anemia      Calculus of kidney      CAP (community acquired pneumonia)      Chronic rhinitis      Closed fracture of multiple ribs      COPD (chronic obstructive pulmonary disease) (H)      Depression      Herpes zoster      HTN (hypertension)      IBS (irritable bowel syndrome)      Kyphosis      Osteoarthritis      Osteoporosis      Pathologic fracture of vertebra      Post herpetic neuralgia      Pulmonary nodule      Septicemia (H)         Medications:  Current Outpatient Prescriptions   Medication Sig     acetaminophen (TYLENOL) 500 MG tablet Take 500-1,000 mg by mouth every 4 (four) hours as needed for pain.     aspirin 81 MG EC tablet Take 81 mg by mouth daily.     benzonatate (TESSALON) 100 MG capsule Take 100 mg by mouth 3 (three) times a day as needed for cough.     cetirizine (ZYRTEC) 5 MG tablet Take 5 mg by mouth daily.     ferrous sulfate 325 (65 FE) MG tablet Take 1 tablet by mouth daily with breakfast.     fluticasone-salmeterol (ADVAIR HFA) 115-21 mcg/actuation inhaler Inhale 2 puffs 2 (two) times a day.     folic acid/multivit-min/lutein (CENTRUM SILVER ORAL) Take 1 tablet by mouth daily.     gabapentin (NEURONTIN) 100 MG capsule Take 100 mg by mouth 2 (two) times a day.     ipratropium-albuterol (DUO-NEB) 0.5-2.5 mg/3 mL nebulizer Inhale 3 mL every 4 (four) hours as needed.     levalbuterol (XOPENEX) 0.63 mg/3 mL nebulizer solution Take 1 ampule by nebulization 4 (four) times a day.     lisinopril (PRINIVIL,ZESTRIL) 5 MG tablet Take 5 mg by mouth daily.     mirtazapine (REMERON) 7.5 MG tablet Take 7.5 mg by mouth at bedtime.     OLANZapine (ZYPREXA) 2.5 MG tablet Take 2.5 mg by mouth at bedtime.     triamcinolone (NASACORT) 55 mcg nasal inhaler 2 sprays into each nostril daily.     umeclidinium (INCRUSE ELLIPTA) 62.5 mcg/actuation DsDv inhaler Inhale 1 puff daily.       Today:  Labs last week, anemia (chronic). Continues on oxygen as at home. States breathing about baseline. Some cough, no phlegm. No fever. No dizziness. On pudding thick fluids, working with speech therapy. No diarrhea or constipation. Likely discharge in about a week. BPs satisfactory.      Physical Exam:   General: Patient is alert pleasant female, no distress.   Vitals: Reviewed. On oxygen.  HEENT: Head is NCAT. Eyes show no injection or icterus. Nares negative. Oropharynx well hydrated.  Neck: Supple. No tenderness or adenopathy. No JVD.  Lungs: Decreased  breath sounds. No wheezes.  Cardiovascular: Regular rate and rhythm, normal S1, S2.  Back: No spinal or CVA tenderness.  Abdomen: Soft, no tenderness on exam. Bowel sounds present. No guarding rebound or rigidity.  Extremities: One plus LE edema.      Labs:  Results for orders placed or performed in visit on 06/15/18   Basic Metabolic Panel   Result Value Ref Range    Sodium 145 136 - 145 mmol/L    Potassium 4.6 3.5 - 5.0 mmol/L    Chloride 100 98 - 107 mmol/L    CO2 39 (H) 22 - 31 mmol/L    Anion Gap, Calculation 6 5 - 18 mmol/L    Glucose 89 70 - 125 mg/dL    Calcium 9.0 8.5 - 10.5 mg/dL    BUN 12 8 - 28 mg/dL    Creatinine 0.50 (L) 0.60 - 1.10 mg/dL    GFR MDRD Af Amer >60 >60 mL/min/1.73m2    GFR MDRD Non Af Amer >60 >60 mL/min/1.73m2       Lab Results   Component Value Date    WBC 5.4 06/15/2018    HGB 8.3 (L) 06/15/2018    HCT 29.7 (L) 06/15/2018     (H) 06/15/2018     06/15/2018       Assessment/Plan:  1. COPD. Oxygen dependent. Has nebs. Recent pneumonia causing acute exac.   2. HTN. BPs satisfactory.  3. Anemia. She is on iron.  4. Weakness. Continue efforts in therapy.  5. Aspiration risk. Continue pudding thick. Working with ST.        Electronically signed by: Mayuri Hubbard MD

## 2021-06-18 NOTE — PROGRESS NOTES
[facility]    Facility:   [facility] [[id number]]   Code Status: DNR/DNI       Chief Complaint   Patient presents with     Follow Up     TCU 6/14/18.       HPI (TCU H & P 6/1/18):   Kerry is a 84 y.o. female with history of COPD, dependent on oxygen at home recently hospitalized for pneumonia and weakness at [facility]. Her discharge summary is partially excerpted below:    HPI: 84-year-old female comes from a senior living facility with several days of weakness. This has been going on about a week. Kids brought some Mother's Day food over a couple of days ago and she was feeling weak at that time. She did eat. However, she has not had much appetite all week. She has a chronic right-handed tremor and feels like her tremor is worse recently. Has a nonproductive cough with occasional thick yellow sputum over the last several days. Has chronic oxygen-dependent COPD and feels a little bit more dyspneic with exertion. Mostly, she feels weak. This morning, she could barely get out of her chair, so she comes to the emergency department. No falls. She does use a cane or a walker all the time. No medication changes recently. Has been compliant with her medications. Has been wearing her oxygen religiously. No fever. No nausea or vomiting. No diarrhea or constipation. No melena.    Hospital Course: admitted through ER to medical unit and the following issues were addressed:    Acute on chronic hypoxic and hypercarbic respiratory failure  -felt due to LLL community acquired pneumonia, started on broad spectrum abx but she continued to worsen felt due to COPD exacerbation  -developed worsened hypercarbic and hypoxic respiratory failure requiring intubation  -after several days was extubated, back to baseline oxygenation of 3liters via nc  -finished course of abx, and prednisone quickly tapered  -given severity of disease she remains tenuous and changed to DNAR status.  Recommended comfort cares in future if breathing deteriorates  -given ongoing weakness plans made for discharge to TCU for ongoing therapies      Weakness.   - Suspect this is multifactorial due to a combination of pneumonia and anemia.     -Physical Therapy and Occupational Therapy saw and TCU recommended and arranged  -her swallow also quite weak, video swallow showed aspiration on thin liquids, so put on pudding thick liquids with plans for ongoing speech therapy and f/u video swallow in a couple of weeks  -given wt loss and anemia, there is concern of underlying malignancy, with initial w/u including CT chest/abd/pelvis negative(with recommendation for 3 month chest CT f/u)     Severe protein/calorie malnutrition  -30# wt loss over past 2 yrs  -discussed need to continue to push calories and nutritious foods     Anemia.    -No evidence of melena or other blood loss.    -hemolysis testing negative  -very low iron levels, given iv venofer load and started po iron upon discharge  - SPEP still pending at time of discharge     pulm artery hypertension   -echo done prior to admission with preserved EF, +diastolic dysfunction, moderate pulm HTN and moderate tricuspid regurgitation  -given pulm HTN treated with diuresis in ICU but not felt indicated as outpt     Pressure ulcers  -she does have unstageable pressure ulcers buttocks which were present on admission     Overall stabilized and discharged to TCU on 5/25/18 for PT, OT, nursing cares, medical management and monitoring.       Past Medical History:  Past Medical History:   Diagnosis Date     Anemia      Calculus of kidney      CAP (community acquired pneumonia)      Chronic rhinitis      Closed fracture of multiple ribs      COPD (chronic obstructive pulmonary disease) (H)      Depression      Herpes zoster      HTN (hypertension)      IBS (irritable bowel syndrome)      Kyphosis      Osteoarthritis      Osteoporosis      Pathologic fracture of vertebra      Post  herpetic neuralgia      Pulmonary nodule      Septicemia (H)        Medications:  Current Outpatient Prescriptions   Medication Sig     acetaminophen (TYLENOL) 500 MG tablet Take 500-1,000 mg by mouth every 4 (four) hours as needed for pain.     aspirin 81 MG EC tablet Take 81 mg by mouth daily.     benzonatate (TESSALON) 100 MG capsule Take 100 mg by mouth 3 (three) times a day as needed for cough.     cetirizine (ZYRTEC) 5 MG tablet Take 5 mg by mouth daily.     ferrous sulfate 325 (65 FE) MG tablet Take 1 tablet by mouth daily with breakfast.     fluticasone-salmeterol (ADVAIR HFA) 115-21 mcg/actuation inhaler Inhale 2 puffs 2 (two) times a day.     folic acid/multivit-min/lutein (CENTRUM SILVER ORAL) Take 1 tablet by mouth daily.     gabapentin (NEURONTIN) 100 MG capsule Take 100 mg by mouth 2 (two) times a day.     ipratropium-albuterol (DUO-NEB) 0.5-2.5 mg/3 mL nebulizer Inhale 3 mL every 4 (four) hours as needed.     levalbuterol (XOPENEX) 0.63 mg/3 mL nebulizer solution Take 1 ampule by nebulization 4 (four) times a day.     lisinopril (PRINIVIL,ZESTRIL) 5 MG tablet Take 5 mg by mouth daily.     mirtazapine (REMERON) 7.5 MG tablet Take 7.5 mg by mouth at bedtime.     OLANZapine (ZYPREXA) 2.5 MG tablet Take 2.5 mg by mouth at bedtime.     triamcinolone (NASACORT) 55 mcg nasal inhaler 2 sprays into each nostril daily.     umeclidinium (INCRUSE ELLIPTA) 62.5 mcg/actuation DsDv inhaler Inhale 1 puff daily.       Today:  Feels weak, not doing as well even in therapy. Still on oxygen as at home. Some increased cough, minimal phlegm. No fever. No dizziness. She is a little upset about discharge planning, not sure what to do. Swallow study yesterday confirmed need to continue on pudding thick and she is okay with that. Does not feel dehydrated. Will check basic labs tomorrow.       Physical Exam:   General: Patient is alert pleasant female, no distress.   Vitals: Reviewed. On oxygen.  HEENT: Head is NCAT. Eyes show  no injection or icterus. Nares negative. Oropharynx well hydrated.  Neck: Supple. No tenderness or adenopathy. No JVD.  Lungs: Decreased breath sounds. Rhonchi with cough. No wheezes.  Cardiovascular: Regular rate and rhythm, normal S1, S2.  Back: No spinal or CVA tenderness.  Abdomen: Soft, no tenderness on exam. Bowel sounds present. No guarding rebound or rigidity.  Extremities: One plus LE edema.      Labs:  Outside hospital.      Assessment/Plan:  1. O2 dependent COPD. Recent acute on chronic resp failure secondary to pneumonia.  2. Pneumonia. Infectious sx likely resolved. Still with some cough.  3. Weakness. Check labs tomorrow.  4. Aspiration risk. Swallow study revealed need to continue on pudding thick.  5. HTN. Cont meds, monitor BPs.          Electronically signed by: Mayuri Hubbard MD